# Patient Record
Sex: FEMALE | HISPANIC OR LATINO
[De-identification: names, ages, dates, MRNs, and addresses within clinical notes are randomized per-mention and may not be internally consistent; named-entity substitution may affect disease eponyms.]

---

## 2024-03-11 ENCOUNTER — APPOINTMENT (OUTPATIENT)
Dept: ENDOCRINOLOGY | Facility: CLINIC | Age: 59
End: 2024-03-11
Payer: COMMERCIAL

## 2024-03-11 VITALS
HEIGHT: 61 IN | DIASTOLIC BLOOD PRESSURE: 100 MMHG | BODY MASS INDEX: 30.78 KG/M2 | WEIGHT: 163 LBS | SYSTOLIC BLOOD PRESSURE: 160 MMHG

## 2024-03-11 DIAGNOSIS — I10 ESSENTIAL (PRIMARY) HYPERTENSION: ICD-10-CM

## 2024-03-11 DIAGNOSIS — E21.0 PRIMARY HYPERPARATHYROIDISM: ICD-10-CM

## 2024-03-11 DIAGNOSIS — R73.03 PREDIABETES.: ICD-10-CM

## 2024-03-11 PROBLEM — Z00.00 ENCOUNTER FOR PREVENTIVE HEALTH EXAMINATION: Status: ACTIVE | Noted: 2024-03-11

## 2024-03-11 PROCEDURE — 99205 OFFICE O/P NEW HI 60 MIN: CPT

## 2024-03-11 NOTE — PHYSICAL EXAM
[Alert] : alert [Well Nourished] : well nourished [No Acute Distress] : no acute distress [Well Developed] : well developed [Normal Sclera/Conjunctiva] : normal sclera/conjunctiva [EOMI] : extra ocular movement intact [No Proptosis] : no proptosis [Thyroid Not Enlarged] : the thyroid was not enlarged [Normal Oropharynx] : the oropharynx was normal [No Thyroid Nodules] : no palpable thyroid nodules [No Respiratory Distress] : no respiratory distress [No Accessory Muscle Use] : no accessory muscle use [Clear to Auscultation] : lungs were clear to auscultation bilaterally [Normal S1, S2] : normal S1 and S2 [Normal Rate] : heart rate was normal [Regular Rhythm] : with a regular rhythm [No Edema] : no peripheral edema [Pedal Pulses Normal] : the pedal pulses are present [Normal Bowel Sounds] : normal bowel sounds [Not Distended] : not distended [Not Tender] : non-tender [Soft] : abdomen soft [Normal Anterior Cervical Nodes] : no anterior cervical lymphadenopathy [Normal Posterior Cervical Nodes] : no posterior cervical lymphadenopathy [No Spinal Tenderness] : no spinal tenderness [Spine Straight] : spine straight [No Stigmata of Cushings Syndrome] : no stigmata of Cushings Syndrome [Normal Gait] : normal gait [Normal Strength/Tone] : muscle strength and tone were normal [Acanthosis Nigricans] : no acanthosis nigricans [No Rash] : no rash [Normal Reflexes] : deep tendon reflexes were 2+ and symmetric [Oriented x3] : oriented to person, place, and time [No Tremors] : no tremors

## 2024-03-11 NOTE — HISTORY OF PRESENT ILLNESS
[FreeTextEntry1] : 59 y/o F w/ no PMH here for initial evaluation and management of calcium issues generally feels well and endorses no acute complaints. reports recent diagnosis by PCP of hypercalcemia, as high as 11.4 on 9.2023, ionized calcium elevated as well to 6.2, PTH at 110. no CKD. preDM2 at 5.7, not on any prescription meds in the past. stopped calcium tabs once informed of diagnosis. denies past fractures, OP or nephrolithiasis. She otherwise denies any f/c, CP, SOB, palpitations, tremors, depressed mood, anxiety, palpitations, n/v, stool/urinary abn, skin/weight changes, heat/cold intolerance, HAs, breast/nipple changes, polyuria/polydipsia/nocturia or other complaints. no past lithium or HCTZ use

## 2024-03-11 NOTE — ASSESSMENT
[FreeTextEntry1] : 1ry hyperparathyroidism meets NIH criteria for elective parathyroid adenomectomy (hypercalcemia >1 gm above the ULN). unclear presence of end organ damage. refer for US.DEXA and labs w/ 24 hour urine calcium assessment. stop calcium tabs, avoid hypervitaminosis D and sun exposure, proper hydration required. start anti hypertensive on the NV>. Verbalized understanding and agrees with treatment plan, will contact MD and seek emergency medical care if condition changes. r/op other causes of 2ry hypertension

## 2024-07-29 ENCOUNTER — APPOINTMENT (OUTPATIENT)
Dept: ENDOCRINOLOGY | Facility: CLINIC | Age: 59
End: 2024-07-29
Payer: COMMERCIAL

## 2024-07-29 DIAGNOSIS — R73.03 PREDIABETES.: ICD-10-CM

## 2024-07-29 DIAGNOSIS — I10 ESSENTIAL (PRIMARY) HYPERTENSION: ICD-10-CM

## 2024-07-29 DIAGNOSIS — E21.0 PRIMARY HYPERPARATHYROIDISM: ICD-10-CM

## 2024-07-29 PROCEDURE — 99214 OFFICE O/P EST MOD 30 MIN: CPT

## 2024-07-29 PROCEDURE — G2211 COMPLEX E/M VISIT ADD ON: CPT

## 2024-07-29 NOTE — HISTORY OF PRESENT ILLNESS
[FreeTextEntry1] : 57 y/o F w/ no PMH initial evaluation and management of calcium issues generally feels well and endorses no acute complaints. reports recent diagnosis by PCP of hypercalcemia, as high as 11.4 on 9.2023, ionized calcium elevated as well to 6.2, PTH at 110. no CKD. preDM2 at 5.7, not on any prescription meds in the past. stopped calcium tabs once informed of diagnosis. denies past fractures, OP or nephrolithiasis.  7/2024 Here for /fu, generally feels well and endorses no acute complaints. No interval events since LV. Today comes for calcium f/u, completed DEXA and US of the neck but not labs. US confirms possible L lower PA. DEXA w/ Osteoporosis.  She otherwise denies any f/c, CP, SOB, palpitations, tremors, depressed mood, anxiety, palpitations, n/v, stool/urinary abn, skin/weight changes, heat/cold intolerance, HAs, breast/nipple changes, polyuria/polydipsia/nocturia or other complaints. no past lithium or HCTZ use

## 2024-07-29 NOTE — ASSESSMENT
[FreeTextEntry1] : 1ry hyperparathyroidism meets NIH criteria for elective parathyroid adenomectomy (hypercalcemia >1 gm above the ULN, OP). presence of end organ damage reviewed. refer for elective parathyroid adenomectoy. labs w/ 24 hour urine calcium assessment. stop calcium tabs, avoid hypervitaminosis D and sun exposure, proper hydration required. start anti hypertensive on the NV>. Verbalized understanding and agrees with treatment plan, will contact MD and seek emergency medical care if condition changes. r/op other causes of 2ry hypertension

## 2024-07-29 NOTE — DATA REVIEWED
[FreeTextEntry1] : US 7/2024 shows possible L post PA adjacent to Left lower lobe of the thyroid gland

## 2024-08-22 ENCOUNTER — APPOINTMENT (OUTPATIENT)
Dept: OTOLARYNGOLOGY | Facility: CLINIC | Age: 59
End: 2024-08-22
Payer: COMMERCIAL

## 2024-08-22 VITALS
TEMPERATURE: 97.6 F | SYSTOLIC BLOOD PRESSURE: 157 MMHG | WEIGHT: 150 LBS | HEIGHT: 61 IN | OXYGEN SATURATION: 97 % | HEART RATE: 69 BPM | BODY MASS INDEX: 28.32 KG/M2 | DIASTOLIC BLOOD PRESSURE: 90 MMHG

## 2024-08-22 DIAGNOSIS — E21.0 PRIMARY HYPERPARATHYROIDISM: ICD-10-CM

## 2024-08-22 DIAGNOSIS — M81.0 AGE-RELATED OSTEOPOROSIS W/OUT CURRENT PATHOLOGICAL FRACTURE: ICD-10-CM

## 2024-08-22 PROCEDURE — 99204 OFFICE O/P NEW MOD 45 MIN: CPT

## 2024-08-22 PROCEDURE — G2211 COMPLEX E/M VISIT ADD ON: CPT

## 2024-08-30 LAB
25(OH)D3 SERPL-MCNC: 26.9 NG/ML
ALBUMIN SERPL ELPH-MCNC: 4.4 G/DL
ALP BLD-CCNC: 95 U/L
ALT SERPL-CCNC: 23 U/L
ANION GAP SERPL CALC-SCNC: 10 MMOL/L
AST SERPL-CCNC: 22 U/L
BILIRUB SERPL-MCNC: 0.3 MG/DL
BUN SERPL-MCNC: 19 MG/DL
CALCIUM SERPL-MCNC: 11.5 MG/DL
CALCIUM SERPL-MCNC: 11.5 MG/DL
CHLORIDE SERPL-SCNC: 108 MMOL/L
CO2 SERPL-SCNC: 25 MMOL/L
CREAT SERPL-MCNC: 0.73 MG/DL
EGFR: 95 ML/MIN/1.73M2
GLUCOSE SERPL-MCNC: 103 MG/DL
HCT VFR BLD CALC: 45 %
HGB BLD-MCNC: 14.5 G/DL
MCHC RBC-ENTMCNC: 29.6 PG
MCHC RBC-ENTMCNC: 32.2 GM/DL
MCV RBC AUTO: 91.8 FL
PARATHYROID HORMONE INTACT: 182 PG/ML
PLATELET # BLD AUTO: 310 K/UL
POTASSIUM SERPL-SCNC: 5.1 MMOL/L
PROT SERPL-MCNC: 7.1 G/DL
RBC # BLD: 4.9 M/UL
RBC # FLD: 14.5 %
SODIUM SERPL-SCNC: 143 MMOL/L
TSH SERPL-ACNC: 1.69 UIU/ML
WBC # FLD AUTO: 3.71 K/UL

## 2024-08-30 NOTE — HISTORY OF PRESENT ILLNESS
[de-identified] : [place] speaker,  used- ID #[id number] She reports that [place] showing elevated calcium (as high as 11.4 on 9/23 per Dr. Man' note). She has osteoporosis but denies urolithiasis and denies other related symptoms. Her last blood draw is reported to have been in the [place].

## 2024-08-30 NOTE — CONSULT LETTER
[Dear  ___] : Dear  [unfilled], [Consult Letter:] : I had the pleasure of evaluating your patient, [unfilled]. [Please see my note below.] : Please see my note below. [Consult Closing:] : Thank you very much for allowing me to participate in the care of this patient.  If you have any questions, please do not hesitate to contact me. [Sincerely,] : Sincerely, [FreeTextEntry3] : HUMBERTO Boyd Jr, MD, FAAOHNS Otolaryngologist [facility]

## 2024-08-30 NOTE — DATA REVIEWED
[de-identified] : calcium as high as 11.4 on 9/23 per Dr. Man' note;  [de-identified] : 6/24 [facility] sono thyroid: no nodules; "Possible enlarged left parathyroid gland posterior to the left lower pole. Recommend correlation with 4D parathyroid contrast CT" [de-identified] : DEXA reportedly showing osteoporosis

## 2024-08-30 NOTE — CONSULT LETTER
[Dear  ___] : Dear  [unfilled], [Consult Letter:] : I had the pleasure of evaluating your patient, [unfilled]. [Please see my note below.] : Please see my note below. [Consult Closing:] : Thank you very much for allowing me to participate in the care of this patient.  If you have any questions, please do not hesitate to contact me. [Sincerely,] : Sincerely, [FreeTextEntry3] : HUMBERTO Boyd Jr, MD, FAAOHNS Otolaryngologist [facility]

## 2024-08-30 NOTE — DATA REVIEWED
[de-identified] : calcium as high as 11.4 on 9/23 per Dr. Man' note;  [de-identified] : 6/24 [facility] sono thyroid: no nodules; "Possible enlarged left parathyroid gland posterior to the left lower pole. Recommend correlation with 4D parathyroid contrast CT" [de-identified] : DEXA reportedly showing osteoporosis

## 2024-08-30 NOTE — ASSESSMENT
[FreeTextEntry1] : I explained that I no longer do parathyroid surgery and referred her to an appropriate head/neck surgeon.

## 2024-08-30 NOTE — HISTORY OF PRESENT ILLNESS
[de-identified] : [place] speaker,  used- ID #[id number] She reports that [place] bloodwork [place] the [place] showing elevated calcium (as high as 11.4 on 9/23 per Dr. Man' note). She has osteoporosis but denies urolithiasis and denies other related symptoms. Her last blood draw is reported to have been in the [place].

## 2024-09-01 LAB
CAU: 17 MG/DL
CREAT 24H UR-MCNC: 1 G/24 H
CREAT ?TM UR-MCNC: 32 MG/DL
PROT ?TM UR-MCNC: 24 HR
SPECIMEN VOL 24H UR: 3000 ML
SPECIMEN VOL 24H UR: 510 MG/24 H